# Patient Record
Sex: FEMALE | Race: WHITE | URBAN - METROPOLITAN AREA
[De-identification: names, ages, dates, MRNs, and addresses within clinical notes are randomized per-mention and may not be internally consistent; named-entity substitution may affect disease eponyms.]

---

## 2021-11-02 ENCOUNTER — OFFICE VISIT (OUTPATIENT)
Dept: URBAN - METROPOLITAN AREA CLINIC 26 | Facility: CLINIC | Age: 61
End: 2021-11-02
Payer: COMMERCIAL

## 2021-11-02 DIAGNOSIS — H25.13 AGE-RELATED NUCLEAR CATARACT, BILATERAL: ICD-10-CM

## 2021-11-02 DIAGNOSIS — H43.813 VITREOUS DEGENERATION, BILATERAL: ICD-10-CM

## 2021-11-02 DIAGNOSIS — H52.4 PRESBYOPIA: ICD-10-CM

## 2021-11-02 DIAGNOSIS — Z98.890 OTHER SPECIFIED POSTPROCEDURAL STATES: ICD-10-CM

## 2021-11-02 DIAGNOSIS — H04.123 TEAR FILM INSUFFICIENCY OF BILATERAL LACRIMAL GLANDS: Primary | ICD-10-CM

## 2021-11-02 DIAGNOSIS — H35.371 PUCKERING OF MACULA, RIGHT EYE: ICD-10-CM

## 2021-11-02 PROCEDURE — 92004 COMPRE OPH EXAM NEW PT 1/>: CPT | Performed by: OPTOMETRIST

## 2021-11-02 PROCEDURE — 92134 CPTRZ OPH DX IMG PST SGM RTA: CPT | Performed by: OPTOMETRIST

## 2021-11-02 ASSESSMENT — VISUAL ACUITY
OS: 20/20
OD: 20/25

## 2021-11-02 ASSESSMENT — INTRAOCULAR PRESSURE
OS: 15
OD: 16

## 2021-11-02 ASSESSMENT — KERATOMETRY
OS: 39.75
OD: 39.13

## 2021-11-02 NOTE — IMPRESSION/PLAN
Impression: Other specified postprocedural states: Z98.890. Plan: Lasik flap, centered and clear OU.

## 2021-11-02 NOTE — IMPRESSION/PLAN
Impression: Puckering of macula, right eye: H35.371. Plan: ERM with lamellar changes OD. mild blur OD, otherwise, no complaints. will observe at this time. rtc if any vision changes noticed.

## 2022-11-02 ENCOUNTER — OFFICE VISIT (OUTPATIENT)
Dept: URBAN - METROPOLITAN AREA CLINIC 26 | Facility: CLINIC | Age: 62
End: 2022-11-02
Payer: COMMERCIAL

## 2022-11-02 DIAGNOSIS — H43.813 VITREOUS DEGENERATION, BILATERAL: ICD-10-CM

## 2022-11-02 DIAGNOSIS — H35.371 PUCKERING OF MACULA, RIGHT EYE: ICD-10-CM

## 2022-11-02 DIAGNOSIS — Z98.890 OTHER SPECIFIED POSTPROCEDURAL STATES: ICD-10-CM

## 2022-11-02 DIAGNOSIS — H04.123 TEAR FILM INSUFFICIENCY OF BILATERAL LACRIMAL GLANDS: Primary | ICD-10-CM

## 2022-11-02 DIAGNOSIS — H25.813 COMBINED FORMS OF AGE-RELATED CATARACT, BILATERAL: ICD-10-CM

## 2022-11-02 PROCEDURE — 92014 COMPRE OPH EXAM EST PT 1/>: CPT | Performed by: OPTOMETRIST

## 2022-11-02 PROCEDURE — 92134 CPTRZ OPH DX IMG PST SGM RTA: CPT | Performed by: OPTOMETRIST

## 2022-11-02 ASSESSMENT — KERATOMETRY
OS: 39.63
OD: 39.00

## 2022-11-02 ASSESSMENT — INTRAOCULAR PRESSURE
OD: 13
OS: 13

## 2022-11-02 ASSESSMENT — VISUAL ACUITY
OD: 20/25
OS: 20/20

## 2023-01-20 ENCOUNTER — OFFICE VISIT (OUTPATIENT)
Dept: URBAN - METROPOLITAN AREA CLINIC 24 | Facility: CLINIC | Age: 63
End: 2023-01-20
Payer: COMMERCIAL

## 2023-01-20 DIAGNOSIS — Z98.890 OTHER SPECIFIED POSTPROCEDURAL STATES: ICD-10-CM

## 2023-01-20 DIAGNOSIS — H25.13 AGE-RELATED NUCLEAR CATARACT, BILATERAL: ICD-10-CM

## 2023-01-20 DIAGNOSIS — H35.371 PUCKERING OF MACULA, RIGHT EYE: Primary | ICD-10-CM

## 2023-01-20 PROCEDURE — 92134 CPTRZ OPH DX IMG PST SGM RTA: CPT | Performed by: OPHTHALMOLOGY

## 2023-01-20 PROCEDURE — 99204 OFFICE O/P NEW MOD 45 MIN: CPT | Performed by: OPHTHALMOLOGY

## 2023-01-20 RX ORDER — PREDNISOLONE ACETATE 10 MG/ML
1 % SUSPENSION/ DROPS OPHTHALMIC
Qty: 5 | Refills: 1 | Status: ACTIVE
Start: 2023-01-20

## 2023-01-20 RX ORDER — OFLOXACIN 3 MG/ML
0.3 % SOLUTION/ DROPS OPHTHALMIC
Qty: 5 | Refills: 1 | Status: INACTIVE
Start: 2023-01-20 | End: 2023-01-26

## 2023-01-20 ASSESSMENT — INTRAOCULAR PRESSURE
OD: 12
OS: 13

## 2023-01-20 NOTE — IMPRESSION/PLAN
Impression: Puckering of macula, right Plan: ERM - from DR. Minor -- c/o distortion OD. visually significant. PRIOR Dx in [place] . . . KNOWN Dx and OBSERVE prior. Prior no intervention. ADD on Jan '23 in [facility] for eval w some progressive decline in the vision in the right eye. Irreg / distortion. With WORSE symptoms, drop in [place], consider options r/b/a for surgery. Epiretinal membrane (ERM), significant w edema / distortion. VA affected. Long d/w pt, education. Prognosis guarded, wks / mo's recovery post op needed for BCVA and even w surgx, VA does not return to nml. Determined proceed w Vitrectomy /peel. Post op steroid injx may be required. PLAN for VIT / Peel ERM /ILM STTA AFx and 2d position OD POSTOP will get COLORS.

## 2023-01-20 NOTE — IMPRESSION/PLAN
Impression: LASIK myopic Z98.890. Plan: Lasik flap, centered and clear OU. Summers mild c/w age. WOULD progress w surgery --  Pt understands that cataract will progress after vitrectomy surgery. May be in only few wks/mo, but can be delayed by over a year. Will address cataract thereafter w primary eye team PRN. Understood.

## 2023-02-03 ENCOUNTER — ADULT PHYSICAL (OUTPATIENT)
Dept: URBAN - METROPOLITAN AREA CLINIC 24 | Facility: CLINIC | Age: 63
End: 2023-02-03
Payer: COMMERCIAL

## 2023-02-03 DIAGNOSIS — H35.371 PUCKERING OF MACULA, RIGHT EYE: ICD-10-CM

## 2023-02-03 DIAGNOSIS — Z01.818 ENCOUNTER FOR OTHER PREPROCEDURAL EXAMINATION: Primary | ICD-10-CM

## 2023-02-03 PROCEDURE — 99203 OFFICE O/P NEW LOW 30 MIN: CPT | Performed by: REGISTERED NURSE

## 2023-02-11 ENCOUNTER — POST-OPERATIVE VISIT (OUTPATIENT)
Dept: URBAN - METROPOLITAN AREA CLINIC 10 | Facility: CLINIC | Age: 63
End: 2023-02-11

## 2023-02-11 DIAGNOSIS — Z48.810 ENCOUNTER FOR SURGICAL AFTERCARE FOLLOWING SURGERY ON A SENSE ORGAN: Primary | ICD-10-CM

## 2023-02-11 PROCEDURE — 99024 POSTOP FOLLOW-UP VISIT: CPT | Performed by: OPTOMETRIST

## 2023-02-11 ASSESSMENT — INTRAOCULAR PRESSURE
OD: 9
OS: 12

## 2023-02-11 NOTE — IMPRESSION/PLAN
Impression:  Encounter for surgical aftercare following surgery on a sense organ  Z48.810.  Plan: see DR JHONNY mccoy sched

## 2023-02-27 ENCOUNTER — OFFICE VISIT (OUTPATIENT)
Dept: URBAN - METROPOLITAN AREA CLINIC 24 | Facility: CLINIC | Age: 63
End: 2023-02-27
Payer: COMMERCIAL

## 2023-02-27 DIAGNOSIS — H35.371 PUCKERING OF MACULA, RIGHT EYE: Primary | ICD-10-CM

## 2023-02-27 DIAGNOSIS — H25.13 AGE-RELATED NUCLEAR CATARACT, BILATERAL: ICD-10-CM

## 2023-02-27 DIAGNOSIS — Z98.890 OTHER SPECIFIED POSTPROCEDURAL STATES: ICD-10-CM

## 2023-02-27 PROCEDURE — 99024 POSTOP FOLLOW-UP VISIT: CPT | Performed by: OPHTHALMOLOGY

## 2023-02-27 PROCEDURE — 92134 CPTRZ OPH DX IMG PST SGM RTA: CPT | Performed by: OPHTHALMOLOGY

## 2023-02-27 ASSESSMENT — INTRAOCULAR PRESSURE
OD: 10
OS: 10

## 2023-02-27 NOTE — IMPRESSION/PLAN
Impression: Cataract, bilateral: H25.13. Plan: See other plan. LASIK OU  NOTE . Evelyn Guo reminded pt:  Pt understands that cataract will progress after vitrectomy surgery. May be in only few wks/mo, but can be delayed by over a year. Will address cataract thereafter w primary eye team PRN.

## 2023-02-27 NOTE — IMPRESSION/PLAN
Impression: LASIK myopic Z98.890. Plan: Lasik flap, centered and clear OU. Shahriar mild c/w age. WOULD progress w surgery --  Pt understands that cataract will progress after vitrectomy surgery.

## 2023-02-27 NOTE — IMPRESSION/PLAN
Impression: Puckering of macula, right VIT peel AFx OD Feb '23 Plan: ERM - from DR. Minor -- c/o distortion OD. visually significant. PRIOR Dx in [facility] . . . KNOWN Dx and OBSERVE prior. Prior no intervention. ADD on Jan '23 in [facility] for eval w some progressive decline in the vision in the right eye. Irreg / distortion. With WORSE symptoms, drop in [place], consider options r/b/a for surgery. DONE w surgery Melita -   VIT peel AFx OD Feb '23 
    TODAY postop   NO CME postop -- IMPROVED. Finish Gtt[facility]. [facility] f/u PRN -- F/u [facility] 4-6w. REMIND pt:   Long d/w pt, education. Prognosis guarded, wks / mo's recovery post op needed for BCVA and even w surgx, VA does not return to nml.

## 2023-03-23 ENCOUNTER — POST-OPERATIVE VISIT (OUTPATIENT)
Dept: URBAN - METROPOLITAN AREA CLINIC 26 | Facility: CLINIC | Age: 63
End: 2023-03-23
Payer: COMMERCIAL

## 2023-03-23 DIAGNOSIS — Z48.810 ENCOUNTER FOR SURGICAL AFTERCARE FOLLOWING SURGERY ON A SENSE ORGAN: Primary | ICD-10-CM

## 2023-03-23 PROCEDURE — 99024 POSTOP FOLLOW-UP VISIT: CPT | Performed by: OPTOMETRIST

## 2023-03-23 ASSESSMENT — INTRAOCULAR PRESSURE
OS: 15
OD: 16

## 2023-03-23 ASSESSMENT — VISUAL ACUITY
OD: 20/40
OS: 20/20

## 2023-11-16 ENCOUNTER — OFFICE VISIT (OUTPATIENT)
Dept: URBAN - METROPOLITAN AREA CLINIC 26 | Facility: CLINIC | Age: 63
End: 2023-11-16
Payer: COMMERCIAL

## 2023-11-16 DIAGNOSIS — H35.371 PUCKERING OF MACULA, RIGHT EYE: ICD-10-CM

## 2023-11-16 PROCEDURE — 99213 OFFICE O/P EST LOW 20 MIN: CPT | Performed by: OPTOMETRIST

## 2023-12-21 ENCOUNTER — OFFICE VISIT (OUTPATIENT)
Dept: URBAN - METROPOLITAN AREA CLINIC 26 | Facility: CLINIC | Age: 63
End: 2023-12-21
Payer: COMMERCIAL

## 2023-12-21 DIAGNOSIS — H04.123 TEAR FILM INSUFFICIENCY OF BILATERAL LACRIMAL GLANDS: ICD-10-CM

## 2023-12-21 DIAGNOSIS — H43.812 VITREOUS DEGENERATION, LEFT EYE: ICD-10-CM

## 2023-12-21 DIAGNOSIS — Z98.890 OTHER SPECIFIED POSTPROCEDURAL STATES: ICD-10-CM

## 2023-12-21 PROCEDURE — 92134 CPTRZ OPH DX IMG PST SGM RTA: CPT | Performed by: OPTOMETRIST

## 2023-12-21 PROCEDURE — 99214 OFFICE O/P EST MOD 30 MIN: CPT | Performed by: OPTOMETRIST

## 2023-12-21 ASSESSMENT — INTRAOCULAR PRESSURE
OD: 16
OS: 17

## 2023-12-21 ASSESSMENT — VISUAL ACUITY
OS: 20/20
OD: 20/60

## 2023-12-21 ASSESSMENT — KERATOMETRY
OD: 38.75
OS: 39.00

## 2023-12-29 ENCOUNTER — ADULT PHYSICAL (OUTPATIENT)
Dept: URBAN - METROPOLITAN AREA CLINIC 24 | Facility: CLINIC | Age: 63
End: 2023-12-29
Payer: COMMERCIAL

## 2023-12-29 DIAGNOSIS — Z01.818 ENCOUNTER FOR OTHER PREPROCEDURAL EXAMINATION: Primary | ICD-10-CM

## 2023-12-29 DIAGNOSIS — H25.13 AGE-RELATED NUCLEAR CATARACT, BILATERAL: ICD-10-CM

## 2023-12-29 DIAGNOSIS — H25.813 COMBINED FORMS OF AGE-RELATED CATARACT, BILATERAL: Primary | ICD-10-CM

## 2023-12-29 PROCEDURE — 99213 OFFICE O/P EST LOW 20 MIN: CPT | Performed by: PHYSICIAN ASSISTANT

## 2024-03-26 ENCOUNTER — OFFICE VISIT (OUTPATIENT)
Dept: URBAN - METROPOLITAN AREA CLINIC 26 | Facility: CLINIC | Age: 64
End: 2024-03-26
Payer: COMMERCIAL

## 2024-03-26 DIAGNOSIS — H04.123 TEAR FILM INSUFFICIENCY OF BILATERAL LACRIMAL GLANDS: ICD-10-CM

## 2024-03-26 DIAGNOSIS — H35.371 PUCKERING OF MACULA, RIGHT EYE: ICD-10-CM

## 2024-03-26 DIAGNOSIS — H25.813 COMBINED FORMS OF AGE-RELATED CATARACT, BILATERAL: Primary | ICD-10-CM

## 2024-03-26 DIAGNOSIS — Z98.890 OTHER SPECIFIED POSTPROCEDURAL STATES: ICD-10-CM

## 2024-03-26 PROCEDURE — 99214 OFFICE O/P EST MOD 30 MIN: CPT | Performed by: OPHTHALMOLOGY

## 2024-03-26 ASSESSMENT — INTRAOCULAR PRESSURE
OS: 16
OD: 16

## 2024-03-28 ENCOUNTER — OFFICE VISIT (OUTPATIENT)
Dept: URBAN - METROPOLITAN AREA CLINIC 26 | Facility: CLINIC | Age: 64
End: 2024-03-28
Payer: COMMERCIAL

## 2024-03-28 DIAGNOSIS — H43.812 VITREOUS DEGENERATION, LEFT EYE: ICD-10-CM

## 2024-03-28 PROCEDURE — 92134 CPTRZ OPH DX IMG PST SGM RTA: CPT | Performed by: OPTOMETRIST

## 2024-03-28 PROCEDURE — 99214 OFFICE O/P EST MOD 30 MIN: CPT | Performed by: OPTOMETRIST

## 2024-03-28 ASSESSMENT — KERATOMETRY
OD: 38.88
OS: 39.50

## 2024-03-28 ASSESSMENT — VISUAL ACUITY
OS: 20/20
OD: 20/60

## 2024-03-28 ASSESSMENT — INTRAOCULAR PRESSURE
OD: 13
OS: 13

## 2024-04-08 ENCOUNTER — SURGERY (OUTPATIENT)
Dept: URBAN - METROPOLITAN AREA SURGERY 12 | Facility: SURGERY | Age: 64
End: 2024-04-08
Payer: COMMERCIAL

## 2024-04-08 PROCEDURE — 66984 XCAPSL CTRC RMVL W/O ECP: CPT | Performed by: OPHTHALMOLOGY

## 2024-04-09 ENCOUNTER — POST-OPERATIVE VISIT (OUTPATIENT)
Dept: URBAN - METROPOLITAN AREA CLINIC 26 | Facility: CLINIC | Age: 64
End: 2024-04-09
Payer: COMMERCIAL

## 2024-04-09 DIAGNOSIS — Z48.810 ENCOUNTER FOR SURGICAL AFTERCARE FOLLOWING SURGERY ON A SENSE ORGAN: Primary | ICD-10-CM

## 2024-04-09 PROCEDURE — 99024 POSTOP FOLLOW-UP VISIT: CPT | Performed by: OPTOMETRIST

## 2024-04-09 ASSESSMENT — INTRAOCULAR PRESSURE: OD: 18

## 2024-04-16 ENCOUNTER — POST-OPERATIVE VISIT (OUTPATIENT)
Dept: URBAN - METROPOLITAN AREA CLINIC 26 | Facility: CLINIC | Age: 64
End: 2024-04-16
Payer: COMMERCIAL

## 2024-04-16 DIAGNOSIS — Z48.810 ENCOUNTER FOR SURGICAL AFTERCARE FOLLOWING SURGERY ON A SENSE ORGAN: Primary | ICD-10-CM

## 2024-04-16 PROCEDURE — 99024 POSTOP FOLLOW-UP VISIT: CPT | Performed by: OPTOMETRIST

## 2024-04-16 ASSESSMENT — KERATOMETRY
OS: 39.25
OD: 38.63

## 2024-04-16 ASSESSMENT — INTRAOCULAR PRESSURE
OS: 16
OD: 16

## 2024-04-16 ASSESSMENT — VISUAL ACUITY
OD: 20/30
OS: 20/25

## 2024-04-22 ENCOUNTER — SURGERY (OUTPATIENT)
Dept: URBAN - METROPOLITAN AREA SURGERY 12 | Facility: SURGERY | Age: 64
End: 2024-04-22
Payer: COMMERCIAL

## 2024-04-22 DIAGNOSIS — H25.812 COMBINED FORMS OF AGE-RELATED CATARACT, LEFT EYE: Primary | ICD-10-CM

## 2024-04-22 PROCEDURE — 66984 XCAPSL CTRC RMVL W/O ECP: CPT | Performed by: OPHTHALMOLOGY

## 2024-04-23 ENCOUNTER — POST-OPERATIVE VISIT (OUTPATIENT)
Dept: URBAN - METROPOLITAN AREA CLINIC 26 | Facility: CLINIC | Age: 64
End: 2024-04-23
Payer: COMMERCIAL

## 2024-04-23 DIAGNOSIS — Z96.1 PRESENCE OF INTRAOCULAR LENS: Primary | ICD-10-CM

## 2024-04-23 PROCEDURE — 99024 POSTOP FOLLOW-UP VISIT: CPT | Performed by: OPTOMETRIST

## 2024-04-23 ASSESSMENT — INTRAOCULAR PRESSURE: OS: 12

## 2024-05-14 ENCOUNTER — POST-OPERATIVE VISIT (OUTPATIENT)
Dept: URBAN - METROPOLITAN AREA CLINIC 26 | Facility: CLINIC | Age: 64
End: 2024-05-14
Payer: COMMERCIAL

## 2024-05-14 DIAGNOSIS — H52.4 PRESBYOPIA: Primary | ICD-10-CM

## 2024-05-14 DIAGNOSIS — Z96.1 PRESENCE OF INTRAOCULAR LENS: ICD-10-CM

## 2024-05-14 PROCEDURE — 99024 POSTOP FOLLOW-UP VISIT: CPT | Performed by: OPTOMETRIST

## 2024-05-14 ASSESSMENT — VISUAL ACUITY
OS: 20/25
OD: 20/30

## 2024-05-14 ASSESSMENT — INTRAOCULAR PRESSURE
OS: 15
OD: 15

## 2024-05-14 ASSESSMENT — KERATOMETRY
OD: 38.38
OS: 37.88

## 2025-03-26 ENCOUNTER — OFFICE VISIT (OUTPATIENT)
Dept: URBAN - METROPOLITAN AREA CLINIC 26 | Facility: CLINIC | Age: 65
End: 2025-03-26
Payer: COMMERCIAL

## 2025-03-26 DIAGNOSIS — H35.371 PUCKERING OF MACULA, RIGHT EYE: Primary | ICD-10-CM

## 2025-03-26 DIAGNOSIS — H04.123 TEAR FILM INSUFFICIENCY OF BILATERAL LACRIMAL GLANDS: ICD-10-CM

## 2025-03-26 DIAGNOSIS — Z98.890 OTHER SPECIFIED POSTPROCEDURAL STATES: ICD-10-CM

## 2025-03-26 DIAGNOSIS — H43.812 VITREOUS DEGENERATION, LEFT EYE: ICD-10-CM

## 2025-03-26 DIAGNOSIS — H26.493 OTHER SECONDARY CATARACT, BILATERAL: ICD-10-CM

## 2025-03-26 PROCEDURE — 92134 CPTRZ OPH DX IMG PST SGM RTA: CPT | Performed by: OPTOMETRIST

## 2025-03-26 PROCEDURE — 92014 COMPRE OPH EXAM EST PT 1/>: CPT | Performed by: OPTOMETRIST

## 2025-03-26 ASSESSMENT — INTRAOCULAR PRESSURE
OD: 16
OS: 13

## 2025-03-26 ASSESSMENT — KERATOMETRY
OS: 38.88
OD: 38.63

## 2025-03-26 ASSESSMENT — VISUAL ACUITY
OD: 20/30
OS: 20/25